# Patient Record
(demographics unavailable — no encounter records)

---

## 2024-12-10 NOTE — DISCUSSION/SUMMARY
[FreeTextEntry1] : 20 yo G0, irregular menses.   - sent for hormonal panel - testosterone, TSH, PRL, TSH, estradiol hcg - will call with results. Patient not interested in birth control at this time - has appt for next annual 5/2025

## 2024-12-10 NOTE — HISTORY OF PRESENT ILLNESS
[FreeTextEntry1] : 22 yo G0 w/ LMP 8/18/24 here to discuss menses. Reports she has always had longer cycles q4-8wks. Cycles recently have been even more irregular, last period in August. She denies excessive hair growth or acne. Does struggle with weight gain. Sexually active, no issues. Has taken multiple home pregnancy tests, all negative.

## 2025-05-20 NOTE — DISCUSSION/SUMMARY
[FreeTextEntry1] : 23 yo G0, annual exam  - f/up pap - discussed options for cycle control for PCOS - progesterone, OCPs, patches, ring, IUD, etc. Declined at this time. Will work on diet and exercise for now - return to office 1yr annual exam or PRN

## 2025-05-20 NOTE — HISTORY OF PRESENT ILLNESS
[FreeTextEntry1] : 21 yo G0 w/ LMP 1/2025 here for annual exam. No menses since January. Prior to this q4-8wks, not heavy or painful. Has been under more stress since then and had 10lb weight gain. Sexually active, no issues. No abdominal pain or urinary complaints. Some increase in hair growth Had labs 12/2024, all normal except elevated testosterone. Had discussed likely PCOS at that time Graduated nursing school, applying to jobs in [employer]  Last pap 2024 NILM